# Patient Record
Sex: MALE | Race: WHITE | URBAN - METROPOLITAN AREA
[De-identification: names, ages, dates, MRNs, and addresses within clinical notes are randomized per-mention and may not be internally consistent; named-entity substitution may affect disease eponyms.]

---

## 2017-03-15 ENCOUNTER — OFFICE VISIT (OUTPATIENT)
Dept: PEDIATRICS | Facility: CLINIC | Age: 27
End: 2017-03-15
Payer: COMMERCIAL

## 2017-03-15 VITALS
WEIGHT: 184 LBS | HEART RATE: 81 BPM | BODY MASS INDEX: 27.89 KG/M2 | OXYGEN SATURATION: 99 % | SYSTOLIC BLOOD PRESSURE: 142 MMHG | TEMPERATURE: 98.4 F | DIASTOLIC BLOOD PRESSURE: 82 MMHG | HEIGHT: 68 IN

## 2017-03-15 DIAGNOSIS — R03.0 ELEVATED BLOOD PRESSURE READING WITHOUT DIAGNOSIS OF HYPERTENSION: ICD-10-CM

## 2017-03-15 DIAGNOSIS — Z00.00 ENCOUNTER FOR HEALTH MAINTENANCE EXAMINATION IN ADULT: Primary | ICD-10-CM

## 2017-03-15 DIAGNOSIS — Z98.890 HISTORY OF TYMPANOSTOMY: ICD-10-CM

## 2017-03-15 PROCEDURE — 90715 TDAP VACCINE 7 YRS/> IM: CPT | Performed by: INTERNAL MEDICINE

## 2017-03-15 PROCEDURE — 99385 PREV VISIT NEW AGE 18-39: CPT | Mod: 25 | Performed by: INTERNAL MEDICINE

## 2017-03-15 PROCEDURE — 90471 IMMUNIZATION ADMIN: CPT | Performed by: INTERNAL MEDICINE

## 2017-03-15 NOTE — NURSING NOTE
Screening Questionnaire for Adult Immunization    Are you sick today?   No   Do you have allergies to medications, food, a vaccine component or latex?   No   Have you ever had a serious reaction after receiving a vaccination?   Patient wasn't sure, might have gotten sweaty and lightheaded.   Do you have a long-term health problem with heart disease, lung disease, asthma, kidney disease, metabolic disease (e.g. diabetes), anemia, or other blood disorder?   No   Do you have cancer, leukemia, HIV/AIDS, or any other immune system problem?   No   In the past 3 months, have you taken medications that affect  your immune system, such as prednisone, other steroids, or anticancer drugs; drugs for the treatment of rheumatoid arthritis, Crohn s disease, or psoriasis; or have you had radiation treatments?   No   Have you had a seizure, or a brain or other nervous system problem?   No   During the past year, have you received a transfusion of blood or blood     products, or been given immune (gamma) globulin or antiviral drug?   No   For women: Are you pregnant or is there a chance you could become        pregnant during the next month?   No   Have you received any vaccinations in the past 4 weeks?   No     Immunization questionnaire was positive for at least one answer.  Notified Dr. Fernandez.      MNVFC doesn't apply on this patient    Per orders of Dr. Fernandez, injection of Tdap(Adacel) given by Ashley Shah. Patient instructed to remain in clinic for 20 minutes afterwards, and to report any adverse reaction to me immediately.       Screening performed by Ashley Shah on 3/15/2017 at 2:23 PM.

## 2017-03-15 NOTE — PATIENT INSTRUCTIONS
- If ear fullness isn't improving in 1-2 weeks, will refer to ENT  - Nurse only follow up for BP recheck in 2 weeks  - Consider HPV Vaccines  - Healthy food choices, portions & exercise  - Follow up in one year    What You Should Know About HPV and Cancer     What is HPV?   HPV (Human Papillomavirus) is a very common virus. You get it by kissing or touching another person's genitals (skin-to-skin genital contact). HPV can lead to genital warts and many types of cancer. There is a vaccine to prevent HPV.  Why do I need an HPV vaccine?     Even if you are not sexually active right now, the vaccine will protect you in the future. Protection from the shots will last many, many years and likely your entire lifetime.    HPV is so common that you will likely be exposed to it in your lifetime.    You may not be able to tell if you or someone else has HPV.  When should I get vaccinated?    It takes a series of 3 shots to be fully protected from HPV. You will need:    The first shot as early as age 9 (typically given at age 11 or 12).    The second shot about 1 to 2 months after the first shot.    The third shot about 6 months after the first shot.    It is important to get all 3 shots before you become sexually active. (You may still get the series after becoming sexually active, but this is less ideal.)    Call your doctor with questions. Your conversations will be confidential.  What will happen when I get the shot?     You may have a sore arm.    You may feel faint. Your doctor may have you wait 15 minutes before leaving.  For more information  For more information on HPV's role in causing cancer or the HPV vaccine, please visit:   http://www.cancer.org.  For informational purposes only. Not to replace the advice of your health care provider.  Published 2015 by [facility] [employer] [facility]. A collaboration between [facility] and [facility].   Image [id number] courtesy of  iStock.com/[employer] 306790   12/15. Text is dedicated to the public domain.        Preventive Health Recommendations  Male Ages 18 - 25     Yearly exam:             See your health care provider every year in order to  o   Review health changes.   o   Discuss preventive care.    o   Review your medicines if your doctor has prescribed any.    You should be tested each year for STDs (sexually transmitted diseases).     Talk to your provider about cholesterol testing.      If you are at risk for diabetes, you should have a diabetes test (fasting glucose).    Shots: Get a flu shot each year. Get a tetanus shot every 10 years.     Nutrition:    Eat at least 5 servings of fruits and vegetables daily.     Eat whole-grain bread, whole-wheat pasta and brown rice instead of white grains and rice.     Talk to your provider about calcium and Vitamin D.     Lifestyle    Exercise for at least 150 minutes a week (30 minutes a day, 5 days a week). This will help you control your weight and prevent disease.     Limit alcohol to one drink per day.     No smoking.     Wear sunscreen to prevent skin cancer.     See your dentist every six months for an exam and cleaning.

## 2017-03-15 NOTE — NURSING NOTE
"Chief Complaint   Patient presents with     Physical       Initial /86 (BP Location: Right arm, Cuff Size: Adult Regular)  Pulse 81  Temp 98.4  F (36.9  C) (Oral)  Ht 5' 8.25\" (1.734 m)  Wt 184 lb (83.5 kg)  SpO2 99%  BMI 27.77 kg/m2 Estimated body mass index is 27.77 kg/(m^2) as calculated from the following:    Height as of this encounter: 5' 8.25\" (1.734 m).    Weight as of this encounter: 184 lb (83.5 kg).  Medication Reconciliation: complete   Ashley Shah MA    "

## 2017-03-15 NOTE — PROGRESS NOTES
SUBJECTIVE:     CC: Saul Robles is an 26 year old male who presents for preventative health visit.     Physical   Annual:     Getting at least 3 servings of Calcium per day::  Yes    Bi-annual eye exam::  Yes    Dental care twice a year::  Yes    Sleep apnea or symptoms of sleep apnea::  None    Diet::  Regular (no restrictions)    Frequency of exercise::  2-3 days/week    Duration of exercise::  30-45 minutes    Taking medications regularly::  Not Applicable    Additional concerns today::  No    Ear Fullness/Plugging: Patient recently flew to [place]. Noted after climbing up & down a mountain, and after spending time in the ocean. Had ear tubes bilaterally twice as a child. Had two tympanoplasties in his left year. No recent URI symptoms.    Today's PHQ-2 Score:   PHQ-2 ( 1999 Pfizer) 3/15/2017   Little interest or pleasure in doing things Not at all   Feeling down, depressed or hopeless Not at all   PHQ-2 Score 0     Abuse: Current or Past(Physical, Sexual or Emotional)- No  Do you feel safe in your environment - Yes    Social History   Substance Use Topics     Smoking status: Never Smoker     Smokeless tobacco: Not on file     Alcohol use Yes     The patient does not drink >3 drinks per day nor >7 drinks per week.    Last PSA: No results found for: PSA    No results for input(s): CHOL, HDL, LDL, TRIG, CHOLHDLRATIO, NHDL in the last 92333 hours.    Reviewed orders with patient. Reviewed health maintenance and updated orders accordingly - Yes    Reviewed and updated as needed this visit by clinical staff  Tobacco  Allergies  Meds  Med Hx  Surg Hx  Fam Hx  Soc Hx      Reviewed and updated as needed this visit by Provider        ROS:  C: NEGATIVE for fever, chills, change in weight  I: NEGATIVE for worrisome rashes, moles or lesions  E: NEGATIVE for vision changes or irritation  ENT: NEGATIVE for mouth and throat problems  R: NEGATIVE for significant cough or SOB  CV: NEGATIVE for chest pain, palpitations  "or peripheral edema  GI: NEGATIVE for nausea, abdominal pain, or change in bowel habits   male: negative for dysuria, hematuria, decreased urinary stream, erectile dysfunction, urethral discharge  M: NEGATIVE for significant arthralgias or myalgia  N: NEGATIVE for weakness, dizziness or paresthesias  P: NEGATIVE for changes in mood or affect    Problem list, Medication list, Allergies, and Medical/Social/Surgical histories reviewed in [facility] and updated as appropriate.  No FHx of Diabetes, high cholesterol or heart disease  OBJECTIVE:     /86 (BP Location: Right arm, Cuff Size: Adult Regular)  Pulse 81  Temp 98.4  F (36.9  C) (Oral)  Ht 5' 8.25\" (1.734 m)  Wt 184 lb (83.5 kg)  SpO2 99%  BMI 27.77 kg/m2  EXAM:  Physical Exam   Physical Exam   GENERAL: Active, alert, in no acute distress.  SKIN: Clear. No significant rash, abnormal pigmentation or lesions  HEAD: Normocephalic  EYES: Pupils equal, round, reactive, Extraocular muscles intact. Normal conjunctivae.  EARS: Normal bilateral canals. Right TM translucent with small well circumscribed 1mm hole at 5pm c/w previous tube placement location. No hemotympanum, no other evidence of trauma. Left TM with significant scarring throughout.  NOSE: Normal without discharge.  MOUTH/THROAT: Clear. No oral lesions. Teeth without obvious abnormalities.  NECK: Supple, no masses.  Full ROM  LYMPH NODES: No cervical adenopathy  LUNGS: Clear. No rales, rhonchi, wheezing or retractions  HEART: Regular rhythm. Normal S1/S2. No murmurs. Normal pulses.  ABDOMEN: Soft, non-tender, not distended  NEUROLOGIC: No focal findings. Cranial nerves grossly intact. Normal gait, strength and tone  EXTREMITIES: Full range of motion, no deformities    ASSESSMENT/PLAN:     1. Encounter for health maintenance examination in adult  - BP >140/80, nurse only recheck in 2 weeks  - TDaP Today  - Declines STI/HIV testing today  - Diet & Exercise to promote healthy BMI  - Will consider HPV vaccine  - " "Follow up in 1 year for Physical    2. History of Tympanostomy  - Ear fullness, improving. Likely due to high altitude & ocean water in ears  - Small possibility of right tympanic membrane rupture. No hemotympanum, well circumscribed hole more c/w previous tube placement. No scarring  - Ear exam consistent with previous tube placement bilaterally & tympanostomy on left. Will monitor, if not improving, will refer to ENT    3. Elevated blood pressure reading without diagnosis of hypertension  - >140 systolic on repeat check  - Nurse only f/u in 2 weeks    COUNSELING:   Reviewed preventive health counseling, as reflected in patient instructions       Regular exercise       Healthy diet/nutrition       Vision screening       Safe sex practices/STD prevention       HIV screeninx in teen years, 1x in adult years, and at intervals if high risk    BP Screening:   Last 3 BP Readings:    BP Readings from Last 3 Encounters:   03/15/17 146/86   03/15/17          142/82    The following was recommended to the patient:  Re-screen within 4 weeks and recommend lifestyle modifications   Patient to come back for nurse only recheck in 2 weeks     reports that he has never smoked. He does not have any smokeless tobacco history on file.    Estimated body mass index is 27.77 kg/(m^2) as calculated from the following:    Height as of this encounter: 5' 8.25\" (1.734 m).    Weight as of this encounter: 184 lb (83.5 kg).   Weight management plan: Discussed healthy diet and exercise guidelines and patient will follow up in 12 months in clinic to re-evaluate.    Counseling Resources:  ATP IV Guidelines  Pooled Cohorts Equation Calculator  FRAX Risk Assessment  ICSI Preventive Guidelines  Dietary Guidelines for Americans,   USDA's MyPlate  ASA Prophylaxis  Lung CA Screening    Petey Fernandez MD  [facility]  Answers for HPI/ROS submitted by the patient on 3/15/2017   Q1: Little interest or pleasure in doing things: 0=Not at " all  Q2: Feeling down, depressed or hopeless: 0=Not at all  PHQ-2 Score: 0    I have seen the patient, discussed with the resident and agree with the history, physical and plan as documented above.    Parul Shearer MD  Internal Medicine - Pediatrics

## 2017-03-15 NOTE — MR AVS SNAPSHOT
After Visit Summary   3/15/2017    Saul Robles    MRN: [id number]           Patient Information     Date Of Birth          1990        Visit Information        Provider Department      3/15/2017 1:30 PM Petey Fernandez MD [facility]        Today's Diagnoses     Encounter for health maintenance examination in adult    -  1    History of tympanostomy          Care Instructions    - If ear fullness isn't improving in 1-2 weeks, will refer to ENT  - Nurse only follow up for BP recheck in 2 weeks  - Consider HPV Vaccines  - Healthy food choices, portions & exercise  - Follow up in one year    What You Should Know About HPV and Cancer     What is HPV?   HPV (Human Papillomavirus) is a very common virus. You get it by kissing or touching another person's genitals (skin-to-skin genital contact). HPV can lead to genital warts and many types of cancer. There is a vaccine to prevent HPV.  Why do I need an HPV vaccine?     Even if you are not sexually active right now, the vaccine will protect you in the future. Protection from the shots will last many, many years and likely your entire lifetime.    HPV is so common that you will likely be exposed to it in your lifetime.    You may not be able to tell if you or someone else has HPV.  When should I get vaccinated?    It takes a series of 3 shots to be fully protected from HPV. You will need:    The first shot as early as age 9 (typically given at age 11 or 12).    The second shot about 1 to 2 months after the first shot.    The third shot about 6 months after the first shot.    It is important to get all 3 shots before you become sexually active. (You may still get the series after becoming sexually active, but this is less ideal.)    Call your doctor with questions. Your conversations will be confidential.  What will happen when I get the shot?     You may have a sore arm.    You may feel faint. Your doctor may have you wait 15 minutes  before leaving.  For more information  For more information on HPV's role in causing cancer or the HPV vaccine, please visit:   http://www.cancer.org.  For informational purposes only. Not to replace the advice of your health care provider.  Published 2015 by [facility]. A collaboration between [facility] and [facility].   Image [id number] courtesy of iStock.com/[employer] 802656 - 12/15. Text is dedicated to the public domain.        Preventive Health Recommendations  Male Ages 18 - 25     Yearly exam:             See your health care provider every year in order to  o   Review health changes.   o   Discuss preventive care.    o   Review your medicines if your doctor has prescribed any.    You should be tested each year for STDs (sexually transmitted diseases).     Talk to your provider about cholesterol testing.      If you are at risk for diabetes, you should have a diabetes test (fasting glucose).    Shots: Get a flu shot each year. Get a tetanus shot every 10 years.     Nutrition:    Eat at least 5 servings of fruits and vegetables daily.     Eat whole-grain bread, whole-wheat pasta and brown rice instead of white grains and rice.     Talk to your provider about calcium and Vitamin D.     Lifestyle    Exercise for at least 150 minutes a week (30 minutes a day, 5 days a week). This will help you control your weight and prevent disease.     Limit alcohol to one drink per day.     No smoking.     Wear sunscreen to prevent skin cancer.     See your dentist every six months for an exam and cleaning.           Follow-ups after your visit        Follow-up notes from your care team     Return in about 1 year (around 3/15/2018).      Who to contact     If you have questions or need follow up information about today's clinic visit or your schedule please contact [facility] directly at [phone].  Normal or non-critical lab and  "imaging results will be communicated to you by MyChart, letter or phone within 4 business days after the clinic has received the results. If you do not hear from us within 7 days, please contact the clinic through [employer] or phone. If you have a critical or abnormal lab result, we will notify you by phone as soon as possible.  Submit refill requests through [employer] or call your pharmacy and they will forward the refill request to us. Please allow 3 business days for your refill to be completed.          Additional Information About Your Visit        [employer] Information     [employer] lets you send messages to your doctor, view your test results, renew your prescriptions, schedule appointments and more. To sign up, go to [email or web address] . Click on \"Log in\" on the left side of the screen, which will take you to the Welcome page. Then click on \"Sign up Now\" on the right side of the page.     You will be asked to enter the access code listed below, as well as some personal information. Please follow the directions to create your username and password.     Your access code is: [email or web address]  Expires: 2017  2:15 PM     Your access code will  in 90 days. If you need help or a new code, please call your [facility] clinic or [phone].        Care EveryWhere ID     This is your Care EveryWhere ID. This could be used by other organizations to access your [facility] medical records  [id number]        Your Vitals Were     Pulse Temperature Height Pulse Oximetry BMI (Body Mass Index)       81 98.4  F (36.9  C) (Oral) 5' 8.25\" (1.734 m) 99% 27.77 kg/m2        Blood Pressure from Last 3 Encounters:   03/15/17 146/86    Weight from Last 3 Encounters:   03/15/17 184 lb (83.5 kg)              We Performed the Following     ADMIN 1st VACCINE     TDAP (ADACEL AGES 11-64)        Primary Care Provider    None Specified       No primary provider on file.        Thank you!     Thank you for choosing [facility]  " for your care. Our goal is always to provide you with excellent care. Hearing back from our patients is one way we can continue to improve our services. Please take a few minutes to complete the written survey that you may receive in the mail after your visit with us. Thank you!             Your Updated Medication List - Protect others around you: Learn how to safely use, store and throw away your medicines at www.disposemymeds.org.      Notice  As of 3/15/2017  2:27 PM    You have not been prescribed any medications.

## 2017-03-19 PROBLEM — R03.0 ELEVATED BLOOD PRESSURE READING WITHOUT DIAGNOSIS OF HYPERTENSION: Status: ACTIVE | Noted: 2017-03-19

## 2017-03-19 PROBLEM — Z98.890 HISTORY OF TYMPANOSTOMY: Status: ACTIVE | Noted: 2017-03-19
